# Patient Record
Sex: FEMALE | Race: WHITE | NOT HISPANIC OR LATINO
[De-identification: names, ages, dates, MRNs, and addresses within clinical notes are randomized per-mention and may not be internally consistent; named-entity substitution may affect disease eponyms.]

---

## 2020-10-23 ENCOUNTER — TRANSCRIPTION ENCOUNTER (OUTPATIENT)
Age: 35
End: 2020-10-23

## 2022-05-18 PROBLEM — Z00.00 ENCOUNTER FOR PREVENTIVE HEALTH EXAMINATION: Status: ACTIVE | Noted: 2022-05-18

## 2022-05-26 ENCOUNTER — APPOINTMENT (OUTPATIENT)
Dept: BARIATRICS | Facility: CLINIC | Age: 37
End: 2022-05-26
Payer: COMMERCIAL

## 2022-05-26 VITALS
HEIGHT: 63 IN | OXYGEN SATURATION: 99 % | BODY MASS INDEX: 18.13 KG/M2 | WEIGHT: 102.31 LBS | DIASTOLIC BLOOD PRESSURE: 74 MMHG | SYSTOLIC BLOOD PRESSURE: 105 MMHG | TEMPERATURE: 97.1 F | HEART RATE: 75 BPM

## 2022-05-26 PROCEDURE — 99205 OFFICE O/P NEW HI 60 MIN: CPT

## 2022-07-14 ENCOUNTER — LABORATORY RESULT (OUTPATIENT)
Age: 37
End: 2022-07-14

## 2022-07-14 ENCOUNTER — FORM ENCOUNTER (OUTPATIENT)
Age: 37
End: 2022-07-14

## 2022-07-15 ENCOUNTER — RESULT REVIEW (OUTPATIENT)
Age: 37
End: 2022-07-15

## 2022-07-15 ENCOUNTER — OUTPATIENT (OUTPATIENT)
Dept: OUTPATIENT SERVICES | Facility: HOSPITAL | Age: 37
LOS: 1 days | Discharge: HOME | End: 2022-07-15
Payer: COMMERCIAL

## 2022-07-15 ENCOUNTER — TRANSCRIPTION ENCOUNTER (OUTPATIENT)
Age: 37
End: 2022-07-15

## 2022-07-15 PROCEDURE — C1889: CPT

## 2022-07-15 PROCEDURE — 43239 EGD BIOPSY SINGLE/MULTIPLE: CPT

## 2022-07-15 PROCEDURE — 88305 TISSUE EXAM BY PATHOLOGIST: CPT

## 2022-07-15 PROCEDURE — 91035 G-ESOPH REFLX TST W/ELECTROD: CPT

## 2022-07-15 PROCEDURE — 88305 TISSUE EXAM BY PATHOLOGIST: CPT | Mod: 26

## 2022-07-15 PROCEDURE — 91010 ESOPHAGUS MOTILITY STUDY: CPT | Mod: 26

## 2022-07-15 PROCEDURE — 91035 G-ESOPH REFLX TST W/ELECTROD: CPT | Mod: 26

## 2022-07-15 DEVICE — IMPLANTABLE DEVICE: Type: IMPLANTABLE DEVICE | Status: FUNCTIONAL

## 2022-07-18 LAB — SURGICAL PATHOLOGY STUDY: SIGNIFICANT CHANGE UP

## 2022-07-28 ENCOUNTER — APPOINTMENT (OUTPATIENT)
Dept: BARIATRICS | Facility: CLINIC | Age: 37
End: 2022-07-28

## 2022-07-28 PROCEDURE — 99215 OFFICE O/P EST HI 40 MIN: CPT | Mod: 95

## 2022-07-29 NOTE — HISTORY OF PRESENT ILLNESS
[de-identified] : Patient is a 37 year old F who is here today for a follow up evaluation of severe reflux symptoms.C/o worsening acidity, muscles tension and knots, hives, swelling in the right arm veins since last week, Wednesday on the area of IV placement. Reports that her symptoms has worsen since EGD. Reports of histamine intolerance. C/o brain fog which has gotten better. Denies overall swelling of the arm. FMHx of fibromyalgia. Patient is in physical therapy. She reports of severe acid reflux episode twice which felt like a MI  in December, 2017 after taking an OTC Citizen of [place] medication that reportedly consisted of codine and in feb, 2022 after COVID-19 infected with delta variant. She has experienced acidity and pain around the epigastric area. She is on Dexilant. Got COVID-19 for the second time which she reportedly triggered acid reflux again.EGD and Bravo test from 07/15/2022 was reviewed. Her Arenas was mildly positive. I believe an initial approach consisting of dietary changes [id number] would be appropriate for the patinet. Patient is going back to work in September. Referred to Dr. Rodriguez.

## 2022-07-29 NOTE — END OF VISIT
[FreeTextEntry3] : All medical record entries made by the Scribe were at my, MARTA Hobson , direction and personally dictated by me on 07/28/2022 . I have reviewed the chart and agree that the record accurately reflects my personal performance of the history, physical exam, assessment and plan. I have also personally directed, reviewed, and agreed with the chart.\par  [Time Spent: ___ minutes] : I have spent [unfilled] minutes of time on the encounter.

## 2022-07-29 NOTE — PLAN
[FreeTextEntry1] : Will refer to , GI for the evaluation of histamine allergy and dietary intolerances that triggered acid reflux. Will refer to  is not available. Will f/u after the GI evaluation or prn.

## 2022-07-29 NOTE — ADDENDUM
[FreeTextEntry1] : Documented by Gabriel Wilks acting as a scribe for MARTA Hobson on 07/28/2022\par

## 2022-07-29 NOTE — ASSESSMENT
[FreeTextEntry1] : Patient is a 37 year old F who is here today for a follow up evaluation of severe reflux symptoms. EGD revealed everything WNL. Biopsy revealed mild cardioesophagitis, compatible with reflux effects but overall report is WNL . Discussed the reports of EGD and bravo test in length and explained each term of the report that were  abnormal. Have explained to the patient that the mild cardioesophagitis is normal specially her severe GERD.  Suggested the patient to continue with physical therapy for her concern of fibromyalgia. Have explained to the patient that her swelling of the veins could be inflammation at the site of IV placement and might go away with time. Will wait and observe and reassess if the symptoms exacerbate. After the review of her EGD, I believe that her acid reflux can be treated with medical treatment as well. Have discussed with the patient surgical and non surgical options to treat acid reflux like dietary changes, medical treatment and fundoplication as the surgical option. Will discuss more at the next visit after her visit with the GI. \par \par

## 2022-08-03 ENCOUNTER — APPOINTMENT (OUTPATIENT)
Dept: GASTROENTEROLOGY | Facility: HOSPITAL | Age: 37
End: 2022-08-03

## 2022-08-03 DIAGNOSIS — R14.0 ABDOMINAL DISTENSION (GASEOUS): ICD-10-CM

## 2022-08-03 PROCEDURE — 99205 OFFICE O/P NEW HI 60 MIN: CPT | Mod: 95

## 2022-08-03 NOTE — REASON FOR VISIT
[Initial Evaluation] : an initial evaluation [Home] : at home, [unfilled] , at the time of the visit. [Medical Office: ([facility])___] : at the medical office located in  [Patient] : the patient [Self] : self
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
## 2022-08-03 NOTE — ASSESSMENT
[FreeTextEntry1] : 37F presents with hx injury, opiate use, nsaid use, antibiotic use with heartburn/sore throat,  hives, muscle pain in  s/p EGD showing nml HRM and cardioesophagitis\par - extesnive counseling on diet and lifestyle, gutbrain\par - gastric motility test\par - sibo test\par - obtain outisde reports\par - consider cromolyn\par  - f/u afterward
Altered Nutrition Related Lab Values

## 2022-08-04 DIAGNOSIS — L50.9 URTICARIA, UNSPECIFIED: ICD-10-CM

## 2022-08-04 RX ORDER — CROMOLYN SODIUM ORAL SOLUTION (CONCENTRATE) 100 MG/5ML
100 SOLUTION, CONCENTRATE ORAL
Qty: 5 | Refills: 3 | Status: ACTIVE | COMMUNITY
Start: 2022-08-04 | End: 1900-01-01

## 2022-08-05 ENCOUNTER — TRANSCRIPTION ENCOUNTER (OUTPATIENT)
Age: 37
End: 2022-08-05

## 2022-08-09 ENCOUNTER — RESULT REVIEW (OUTPATIENT)
Age: 37
End: 2022-08-09

## 2022-08-12 ENCOUNTER — APPOINTMENT (OUTPATIENT)
Dept: NUCLEAR MEDICINE | Facility: HOSPITAL | Age: 37
End: 2022-08-12

## 2022-08-12 ENCOUNTER — OUTPATIENT (OUTPATIENT)
Dept: OUTPATIENT SERVICES | Facility: HOSPITAL | Age: 37
LOS: 1 days | End: 2022-08-12
Payer: COMMERCIAL

## 2022-08-12 PROCEDURE — A9541: CPT

## 2022-08-12 PROCEDURE — 78264 GASTRIC EMPTYING IMG STUDY: CPT

## 2022-08-12 PROCEDURE — 78264 GASTRIC EMPTYING IMG STUDY: CPT | Mod: 26

## 2022-08-18 ENCOUNTER — APPOINTMENT (OUTPATIENT)
Dept: GASTROENTEROLOGY | Facility: CLINIC | Age: 37
End: 2022-08-18

## 2022-08-18 DIAGNOSIS — K31.84 GASTROPARESIS: ICD-10-CM

## 2022-08-18 DIAGNOSIS — K59.04 CHRONIC IDIOPATHIC CONSTIPATION: ICD-10-CM

## 2022-08-18 PROCEDURE — 99214 OFFICE O/P EST MOD 30 MIN: CPT | Mod: 95

## 2022-08-18 RX ORDER — PRUCALOPRIDE 2 MG/1
2 TABLET, FILM COATED ORAL
Qty: 90 | Refills: 3 | Status: ACTIVE | COMMUNITY
Start: 2022-08-18 | End: 1900-01-01

## 2022-08-18 NOTE — ASSESSMENT
[FreeTextEntry1] : 37F presents with hx injury, opiate use, nsaid use, antibiotic use with heartburn/sore throat,  hives, muscle pain in  s/p EGD showing nml HRM and cardioesophagitis and now GASTROPARESIS\par - extensive counseling on diet and lifestyle, gutbrain\par - referral to nutrition\par - workup of dysmotility including LIZZETH, DM, scleroderma and if negative and need further eval the [facility] Gastrointestinal Dysmotility, Autoimmune/Paraneoplastic Evaluation, Serum\par - postpone sibo test for now\par - obtain outisde reports\par - consider cromolyn\par - motegrity r/b/a/i discused and pt agreeable\par  - f/u afterward

## 2022-08-18 NOTE — REASON FOR VISIT
[Home] : at home, [unfilled] , at the time of the visit. [Medical Office: ([facility])___] : at the medical office located in  [Patient] : the patient [Self] : self
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
## 2022-09-13 ENCOUNTER — TRANSCRIPTION ENCOUNTER (OUTPATIENT)
Age: 37
End: 2022-09-13

## 2022-09-30 ENCOUNTER — TRANSCRIPTION ENCOUNTER (OUTPATIENT)
Age: 37
End: 2022-09-30

## 2022-09-30 ENCOUNTER — APPOINTMENT (OUTPATIENT)
Dept: GASTROENTEROLOGY | Facility: CLINIC | Age: 37
End: 2022-09-30

## 2022-09-30 PROCEDURE — 99215 OFFICE O/P EST HI 40 MIN: CPT | Mod: 95

## 2022-09-30 NOTE — REASON FOR VISIT
[Follow-up] : a follow-up of an existing diagnosis [Home] : at home, [unfilled] , at the time of the visit. [Medical Office: ([facility])___] : at the medical office located in  [Patient] : the patient [Self] : self
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
## 2022-11-18 ENCOUNTER — APPOINTMENT (OUTPATIENT)
Dept: GASTROENTEROLOGY | Facility: CLINIC | Age: 37
End: 2022-11-18

## 2022-11-18 PROCEDURE — 99215 OFFICE O/P EST HI 40 MIN: CPT | Mod: 95

## 2022-11-18 NOTE — REASON FOR VISIT
[Home] : at home, [unfilled] , at the time of the visit. [Medical Office: ([facility])___] : at the medical office located in  [Patient] : the patient [Self] : self [Follow-up] : a follow-up of an existing diagnosis
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
## 2023-02-16 ENCOUNTER — TRANSCRIPTION ENCOUNTER (OUTPATIENT)
Age: 38
End: 2023-02-16

## 2023-02-27 ENCOUNTER — TRANSCRIPTION ENCOUNTER (OUTPATIENT)
Age: 38
End: 2023-02-27

## 2023-02-27 ENCOUNTER — NON-APPOINTMENT (OUTPATIENT)
Age: 38
End: 2023-02-27

## 2023-03-01 ENCOUNTER — TRANSCRIPTION ENCOUNTER (OUTPATIENT)
Age: 38
End: 2023-03-01

## 2024-05-28 ENCOUNTER — APPOINTMENT (OUTPATIENT)
Age: 39
End: 2024-05-28
Payer: COMMERCIAL

## 2024-05-28 DIAGNOSIS — D47.09 OTHER MAST CELL NEOPLASMS OF UNCERTAIN BEHAVIOR: ICD-10-CM

## 2024-05-28 DIAGNOSIS — M79.7 FIBROMYALGIA: ICD-10-CM

## 2024-05-28 DIAGNOSIS — Z77.120 CONTACT WITH AND (SUSPECTED) EXPOSURE TO MOLD (TOXIC): ICD-10-CM

## 2024-05-28 DIAGNOSIS — J31.0 CHRONIC RHINITIS: ICD-10-CM

## 2024-05-28 PROCEDURE — 99442: CPT
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
## 2024-05-28 NOTE — PHYSICAL EXAM
[General Appearance - Alert] : alert [General Appearance - In No Acute Distress] : in no acute distress [FreeTextEntry1] : over the telephone, patient sounded alert and oriented x 3. she was able to speak in full sentences, her responses were adequate. no coughing noted during the visit. patient was anxious about her medical condition. communication was of very poor quality, it was difficult to clearly understand the patient at times.

## 2024-05-28 NOTE — ASSESSMENT
[FreeTextEntry1] : patient with nonspecific symptoms including gi-respiratory-hives and pain. she has eventually been diagnosed with fibromyalgia and mast cell activation syndrome, and other conditions that i could not fully make up during our conversation. she has been exposed to mold in her apartment, exposure been confirmed reportedly by an . patient here to inquire about any association between her conditions and mold. i clarified patient that the literature about health effects of mold is not fully developed and that there are many different theories and opinions about health effects due to mold. i explained patient that the school of thought i follow states that environmental molds can cause allergic symptoms or irritative symptoms which could explain some of her conditions. i explained her that, to my expertise and knowledge, these environmental molds generally do not cause infections in immunocompetent individuals, and, therefore, i did not think she was infected with mold. i also explained that the school of thought i follow does not consider that mold exposure can result in immunological disease. i offered patient to undergo a mold allergy blood test. she insisted on the possibility of mold infection, i told her that i don't treat those and referred her to an infectious disease doctor to further clarify. i clarified patient that in any case, the treatment for environmental mold exposure and mold related conditions is avoidance of exposure, which she has fulfilled by moving out of her apartment. therefore, had her conditions be due to mold, she should be getting progressively better.  p. will do a blood test for mold allergies. will refer to ID as per pt's request. reassurance and support provided. lots of clarification offered.

## (undated) DEVICE — FORCEP RADIAL JAW 4 W NDL 2.2MM 2.8MM 240CM ORANGE DISP